# Patient Record
Sex: FEMALE | Race: WHITE | NOT HISPANIC OR LATINO | Employment: OTHER | URBAN - METROPOLITAN AREA
[De-identification: names, ages, dates, MRNs, and addresses within clinical notes are randomized per-mention and may not be internally consistent; named-entity substitution may affect disease eponyms.]

---

## 2017-03-08 ENCOUNTER — HISTORICAL (OUTPATIENT)
Dept: ADMINISTRATIVE | Facility: HOSPITAL | Age: 67
End: 2017-03-08

## 2017-05-19 ENCOUNTER — HISTORICAL (OUTPATIENT)
Dept: RADIOLOGY | Facility: HOSPITAL | Age: 67
End: 2017-05-19

## 2017-06-16 ENCOUNTER — HISTORICAL (OUTPATIENT)
Dept: RADIOLOGY | Facility: HOSPITAL | Age: 67
End: 2017-06-16

## 2017-08-16 ENCOUNTER — HISTORICAL (OUTPATIENT)
Dept: ADMINISTRATIVE | Facility: HOSPITAL | Age: 67
End: 2017-08-16

## 2017-11-15 ENCOUNTER — HISTORICAL (OUTPATIENT)
Dept: ADMINISTRATIVE | Facility: HOSPITAL | Age: 67
End: 2017-11-15

## 2018-08-06 ENCOUNTER — HISTORICAL (OUTPATIENT)
Dept: ADMINISTRATIVE | Facility: HOSPITAL | Age: 68
End: 2018-08-06

## 2018-12-06 ENCOUNTER — HISTORICAL (OUTPATIENT)
Dept: ADMINISTRATIVE | Facility: HOSPITAL | Age: 68
End: 2018-12-06

## 2019-03-20 ENCOUNTER — HISTORICAL (OUTPATIENT)
Dept: ADMINISTRATIVE | Facility: HOSPITAL | Age: 69
End: 2019-03-20

## 2019-05-06 ENCOUNTER — HISTORICAL (OUTPATIENT)
Dept: ADMINISTRATIVE | Facility: HOSPITAL | Age: 69
End: 2019-05-06

## 2019-08-30 ENCOUNTER — HISTORICAL (OUTPATIENT)
Dept: ADMINISTRATIVE | Facility: HOSPITAL | Age: 69
End: 2019-08-30

## 2019-10-07 ENCOUNTER — HISTORICAL (OUTPATIENT)
Dept: ADMINISTRATIVE | Facility: HOSPITAL | Age: 69
End: 2019-10-07

## 2020-06-10 ENCOUNTER — HISTORICAL (OUTPATIENT)
Dept: ADMINISTRATIVE | Facility: HOSPITAL | Age: 70
End: 2020-06-10

## 2020-10-26 ENCOUNTER — HISTORICAL (OUTPATIENT)
Dept: RADIOLOGY | Facility: HOSPITAL | Age: 70
End: 2020-10-26

## 2020-12-08 ENCOUNTER — HISTORICAL (OUTPATIENT)
Dept: SURGERY | Facility: HOSPITAL | Age: 70
End: 2020-12-08

## 2021-03-08 ENCOUNTER — HISTORICAL (OUTPATIENT)
Dept: ADMINISTRATIVE | Facility: HOSPITAL | Age: 71
End: 2021-03-08

## 2022-04-07 ENCOUNTER — HISTORICAL (OUTPATIENT)
Dept: ADMINISTRATIVE | Facility: HOSPITAL | Age: 72
End: 2022-04-07

## 2022-04-23 VITALS
WEIGHT: 205.25 LBS | HEIGHT: 66 IN | BODY MASS INDEX: 32.99 KG/M2 | DIASTOLIC BLOOD PRESSURE: 74 MMHG | SYSTOLIC BLOOD PRESSURE: 132 MMHG

## 2022-05-01 NOTE — HISTORICAL [facility] CERNER
This is a historical note converted from Jennifer. Formatting and pictures may have been removed.  Please reference Jennifer for original formatting and attached multimedia. Chief Complaint  Pt is here for bilateral lateral hip pain. Pt had a left hip bursectomy in December of 2020. Pt stated the pain never went away since the surgery.  History of Present Illness  Patient is here for?mild bilateral hip pain?and aching.? She says this pain feels different than?the bursal pain. ?She said most of the bursal pain has resolved. ?She wanted to make sure she did not need hip replacements.  Review of Systems  Systemic: No fever, no chills, and no recent weight change.  Head: No headache - frequent.  Eyes: No vision problems.  Otolarnygeal: No hearing loss, no earache, no epistaxis, no hoarseness, and no tooth pain. Gums normal.  Cardiovascular: No chest pain or discomfort and no palpitations.  Pulmonary: No pulmonary symptoms - difficulty sleeping, no dyspnea, and cough not worse in the morning.  Gastrointestinal: Appetite not decreased. No dysphagia and no constant eructation. No nausea, no vomiting, no abdominal pain, no hematochezia, and no loose/mushy stools - frequent. No constipation - frequent.  Genitourinary: No genitourinary symptoms - Getting up every night to urinate and no increase in urinary frequency. No urinary hesitancy. No urinary loss of control - difficulty stopping urination and no burning sensation during urination.  Musculoskeletal: No calf muscle cramps and no localized soft tissue swelling of the ankle.  Neurological: No fainting and no convulsions.  Psychological: Not feeling nervous tension, not feeling nervous from exhaustion, and no depression.  Skin: No rash. Previous history of no ulcers.  ?  Physical Exam  Vitals & Measurements  HR:?80(Peripheral)? BP:?132/74?  HT:?167.64?cm? WT:?93.100?kg? BMI:?33.13?  Bilateral hip exam:  Normal active and passive range of motion in both hips  No tenderness in  the groin?with weightbearing or with range of motion  No palpable tenderness?in the buttocks around the sciatic notch or?iliolumbar regions  No anterior hip tenderness  Very mild?tenderness?over the right and left greater trochanteric bursas  No palpable masses  No swelling, no redness, no increased heat  Healed left?lateral hip incision  2+ dorsal pedal pulses  Normal gait  Normal sensation  Normal motor function  No atrophy  AP pelvis radiograph and AP and lateral right hip radiograph shows?normal?cartilage spaces with no degenerative changes.? No evidence of fracture.? Normal-appearing osseous structures.  ?  Assessment/Plan  1.?Right hip pain?M25.551  ?Refer to pain management  No surgical indications  Ordered:  Office/Outpatient Visit Level 3 Established [id number] PC, Left knee pain  Right hip pain, [facility], 03/08/21 14:08:00 CST  ?  2.?Left hip pain?M25.552  ?  Referrals  Ambulatory Referral, Specialty: Pain Management, Reason: EVALUATE AND TREAT, Refer To: Provider Not Specified, [facility] [place] [address], [place] [address]., Start: 03/08/21 13:58:00 CST, Lumbar radiculopathy   Problem List/Past Medical History  Ongoing  Acute low back pain  Cervical spondylosis  Degenerative disc disease, cervical  Facet arthropathy  Left hip pain  Lumbar degenerative disc disease  Lumbar spine strain  Obesity  Trigger thumb of right hand  Trochanteric bursitis, left hip  Historical  No qualifying data  Procedure/Surgical History  Bursectomy Hip (Left) (12/08/2020)  Excision of Left Hip Bursa and Ligament, Open Approach (12/08/2020)  Excision; trochanteric bursa or calcification (12/08/2020)  Colon  Hernia  Hysterectomy   Medications  CeleBREX 100 mg oral capsule, 100 mg= 1 cap(s), Oral, Daily  cephalexin 250 mg oral capsule, 250 mg= 1 cap(s), Oral, TID,? ?Not Taking, Completed Rx  clindamycin 300 mg oral capsule, 300 mg= 1 cap(s), Oral, q6hr,? ?Not Taking,  Completed Rx  cyclobenzaprine 5 mg oral tablet, 5 mg= 1 tab(s), Oral, BID  Cymbalta 60 mg oral delayed release capsule, 60 mg= 1 cap(s), Oral, Daily,? ?Not Taking per Prescriber: Last Dose Date/Time Unknown  fluticasone 50 mcg/inh nasal spray  gabapentin 100 mg oral capsule, 100 mg= 1 cap(s), Oral, TID,? ?Not taking  gabapentin 300 mg oral capsule, 300 mg= 1 cap(s), Oral, TID,? ?Not Taking per Prescriber  glipiZIDE 10 mg oral tablet, 10 mg= 1 tab(s), Oral, BID  glipiZIDE 5 mg oral tablet, 5 mg= 1 tab(s), Oral, BID  homatropine-hydrocodone 1.5 mg-5 mg/5 mL oral syrup,? ?Not taking: Last Dose Date/Time Unknown  hydrochlorothiazide 12.5 mg oral tablet, 12.5 mg= 1 tab(s), Oral, qAM,? ?Not taking: Last Dose Date/Time Unknown  hydroxychloroquine 200 mg oral tablet, 200 mg= 1 tab(s), Oral, Daily,? ?Not taking: Last Dose Date/Time Unknown  ipratropium 42 mcg/inh (0.06%) nasal spray,? ?Not taking: Last Dose Date/Time Unknown  Medrol Dosepak 4 mg oral tablet, 1 packet(s), Oral, As Directed,? ?Not Taking, Completed Rx: Last Dose Date/Time Unknown  metformin 500 mg oral tablet, 500 mg= 1 tab(s), Oral, BID  methocarbamol 500 mg oral tablet, 500 mg= 1 tab(s), Oral, BID,? ?Not Taking per Prescriber  Mobic 7.5 mg oral tablet, 7.5 mg= 1 tab(s), Oral, BID, 3 refills,? ?Not taking: Last Dose Date/Time Unknown  Mobic 7.5 mg oral tablet, 7.5 mg= 1 tab(s), Oral, BID, 3 refills,? ?Not Taking, Completed Rx: Last Dose Date/Time Unknown  mupirocin 2% topical ointment, 1 keenan, TOP, TID,? ?Not Taking, Completed Rx: Last Dose Date/Time Unknown  naproxen 500 mg oral tablet, 500 mg= 1 tab(s), Oral, BID, 3 refills,? ?Not Taking, Completed Rx: Last Dose Date/Time Unknown  ondansetron 4 mg oral tablet, Oral, q8hr  ondansetron 4 mg oral tablet, disintegrating, 4 mg= 1 tab(s), SL, q6hr  Pantoprazole 40 mg ORAL EC-Tablet, 40 mg= 1 tab(s), Oral, Daily  pramipexole 1 mg oral tablet, 1 mg= 1 tab(s), Oral, Daily  rosuvastatin 20 mg oral tablet, 20 mg= 1  tab(s), Oral, Daily  traMADol 50 mg oral tablet, 50 mg= 1 tab(s), Oral, BID  traZODone 100 mg oral tablet, 200 mg= 2 tab(s), Oral, qPM  traZODONE 150 mg oral tab ( Desyrel ), 150 mg= 1 tab(s), Oral, Once a day (at bedtime),? ?Not taking: Last Dose Date/Time Unknown  triamcinolone 0.1% topical cream, 1 keenan, TOP, BID,? ?Not Taking, Completed Rx: Last Dose Date/Time Unknown  Ventolin HFA 90 mcg/inh inhalation aerosol  Vitamin D, Oral  Allergies  sulfa drugs?(Unknown)  Social History  Abuse/Neglect  No, No, Yes, 03/08/2021  No, 12/23/2020  Alcohol  Current, Wine, 3-5 times per week, 09/22/2016  Employment/School  Retired, 06/10/2020  Home/Environment  Lives with Alone., 06/10/2020  Substance Use  Never, 09/22/2016  Tobacco  Never (less than 100 in lifetime), N/A, 03/08/2021  Family History  Diabetes mellitus type 1.: Brother.  Health Maintenance  Health Maintenance  ???Pending?(in the next year)  ??? ??OverDue  ??? ? ? ?Diabetes Maintenance-HgbA1c due??09/29/18??and every 1??year(s)  ??? ? ? ?Influenza Vaccine due??10/01/20??and every 1??day(s)  ??? ? ? ?Advance Directive due??01/02/21??and every 1??year(s)  ??? ? ? ?Cognitive Screening due??01/02/21??and every 1??year(s)  ??? ??Due?  ??? ? ? ?ADL Screening due??03/08/21??and every 1??year(s)  ??? ? ? ?Aspirin Therapy for CVD Prevention due??03/08/21??and every 1??year(s)  ??? ? ? ?Bone Density Screening due??03/08/21??Variable frequency  ??? ? ? ?Breast Cancer Screening due??03/08/21??Unknown Frequency  ??? ? ? ?Colorectal Screening due??03/08/21??Unknown Frequency  ??? ? ? ?Diabetes Maintenance-Eye Exam due??03/08/21??Unknown Frequency  ??? ? ? ?Diabetes Maintenance-Foot Exam due??03/08/21??Unknown Frequency  ??? ? ? ?Diabetes Maintenance-Microalbumin due??03/08/21??Unknown Frequency  ??? ? ? ?Lung Cancer Screening due??03/08/21??and every 1??year(s)  ??? ? ? ?Medicare Annual Wellness Exam due??03/08/21??and every 1??year(s)  ??? ? ? ?Pneumococcal Vaccine  due??03/08/21??Unknown Frequency  ??? ? ? ?Tetanus Vaccine due??03/08/21??and every 10??year(s)  ??? ? ? ?Zoster Vaccine due??03/08/21??Unknown Frequency  ??? ??Due In Future?  ??? ? ? ?Obesity Screening not due until??01/01/22??and every 1??year(s)  ??? ? ? ?Smoking Cessation not due until??01/01/22??and every 1??year(s)  ??? ? ? ?Alcohol Misuse Screening not due until??01/02/22??and every 1??year(s)  ??? ? ? ?Fall Risk Assessment not due until??01/02/22??and every 1??year(s)  ??? ? ? ?Functional Assessment not due until??01/02/22??and every 1??year(s)  ???Satisfied?(in the past 1 year)  ??? ??Satisfied?  ??? ? ? ?Advance Directive on??12/08/20.??Satisfied by Carol Ann Rivera RN.  ??? ? ? ?Alcohol Misuse Screening on??03/08/21.??Satisfied by Ana Paula Burris LPN  ??? ? ? ?Blood Pressure Screening on??03/08/21.??Satisfied by Ana Paula Burris LPN  ??? ? ? ?Body Mass Index Check on??03/08/21.??Satisfied by Ana Paula Burris LPN  ??? ? ? ?Coronary Artery Disease Maintenance-Lipid Lowering Therapy on??11/19/20.  ??? ? ? ?Depression Screening on??03/08/21.??Satisfied by Ana Paula Burris LPN  ??? ? ? ?Diabetes Screening on??11/20/20.??Satisfied by Jojo Mcgrath  ??? ? ? ?Fall Risk Assessment on??03/08/21.??Satisfied by Ana Paula Burris LPN  ??? ? ? ?Functional Assessment on??03/08/21.??Satisfied by Ana Paula Burris LPN  ??? ? ? ?Obesity Screening on??03/08/21.??Satisfied by Ana Paula Burris LPN  ??? ? ? ?Smoking Cessation on??03/08/21.??Satisfied by Ana Paula Burris LPN??Reason: Expectation Satisfied Elsewhere  ?

## 2022-05-01 NOTE — HISTORICAL [facility]ERN[facility]
This is a historical note converted from Jennifer. Formatting and pictures may have been removed.  Please reference Jennifer for original formatting and attached multimedia. Chief Complaint  NECK PAIN  History of Present Illness  She is complaining of neck pain and lower back pain. ?This is been for some time. ?The neck pain is progressively getting worse. ?She has no radiation into her arms or hands. ?No numbness or tingling. ?No weakness in her upper extremities.? She just has inability to turn or move her neck.  ?  Of her lower back this is something that comes?and goes. ?I have seen her in the past for it with degenerative disc disease. ?It is now progressively getting worse.? She is unable to sweep?are to do certain things.? Certain activities make it worse. ?She has no radiation into her legs or feet no numbness or tingling no bowel or bladder problems.? She does take Celebrex 200 mg once a day.? She does have diabetes.  Review of Systems  Comprehensive Review of Systems performed with no exceptions other than as noted in HPI.  ?  ?  Physical Exam  Vitals & Measurements  T:?98.4? ?F (Oral)? BP:?137/76?  HT:?168?cm? WT:?89.35?kg? BMI:?31.66?  Examination of the cervical spine. ?She sits with a head forward posture. ?She has very limited flexion extension lateral bending and rotation.? Biceps, triceps, brachioradialis reflexes are +2 and symmetrical. ?Shoulder girdle, biceps, triceps, wrist extension flexor strength are normal?and symmetrical. ?Radial pulses +2 and symmetrical. ?Tinel signs are negative at the wrist and elbow. ?Sensation is equal to light touch in both upper extremities.  ?  Examination of the lumbar spine there is tenderness from both posterior superior iliac spines to the SI joints. ?There is no sciatic notch tenderness. ?No greater trochanter tenderness. ?She flexes fingertips to just about her knees. ?Extension and lateral bending are limited due to pain.? She can walk on heels and toes. ?Knee  jerks and ankle jerks are trace but symmetrical. ?Dorsalis pedis posterior tibial pulses +2 and symmetrical. ?Babinski is normal there is no clonus. ?Sitting straight leg raising is negative. ?Supine straight leg raising is positive for back pain bilaterally about 70 degrees with negative Go test.? Sensation is equal to light touch?in both lower extremities.  ?  Examination of her abdomen?it is nontender there is no organomegaly no pulsatile masses.  Assessment/Plan  1.?Acute low back pain?M54.5  ?We talked about a Medrol dose but with her diabetes I would rather not. ?We are increasing her Celebrex 200 mg twice a day I have given her Robaxin to take 750 mg 3-4 times a day as needed?we will start her on physical therapy. ?If she develops radicular symptoms?or does not improve or gets worse she will call me back.  ?  Components of this note were documented using voice recognition systems and are subject to errors not corrected at proof reading. ?Please contact the author for any clarifications.  Ordered:  methocarbamol, 750 mg = 1 tab(s), Oral, TID, PRN PRN spasm, X 14 day(s), # 21 tab(s), 0 Refill(s), Pharmacy: [facility] #[id number], 168, cm, Height/Length Dosing, 06/10/20 9:35:00 CDT, 89.35, kg, Weight Dosing, 06/10/20 9:35:00 CDT  Office/Outpatient Visit Level 4 Established [id number] PC, Acute low back pain  Degenerative disc disease, cervical  Cervical spondylosis  Lumbar degenerative disc disease  Facet arthropathy  Acute neck pain, [facility], 06/10/20 11:03:00 CDT  PT/OT External Referral, 06/10/20 11:03:00 CDT, Acute low back pain  Degenerative disc disease, cervical  Cervical spondylosis  Lumbar degenerative disc disease  Facet arthropathy  Acute neck pain, Evaluate and Treat, 3 X Week, ***General PT Orders**  ?  2.?Degenerative disc disease, cervical?M50.30  Ordered:  methocarbamol, 750 mg = 1 tab(s), Oral, TID, PRN PRN spasm, X 14 day(s), # 21 tab(s), 0 Refill(s), Pharmacy: [facility] #[id number],  168, cm, Height/Length Dosing, 06/10/20 9:35:00 CDT, 89.35, kg, Weight Dosing, 06/10/20 9:35:00 CDT  Office/Outpatient Visit Level 4 Established [id number] PC, Acute low back pain  Degenerative disc disease, cervical  Cervical spondylosis  Lumbar degenerative disc disease  Facet arthropathy  Acute neck pain, [facility], 06/10/20 11:03:00 CDT  PT/OT External Referral, 06/10/20 11:03:00 CDT, Acute low back pain  Degenerative disc disease, cervical  Cervical spondylosis  Lumbar degenerative disc disease  Facet arthropathy  Acute neck pain, Evaluate and Treat, 3 X Week, ***General PT Orders**  ?  3.?Cervical spondylosis?M47.812  Ordered:  methocarbamol, 750 mg = 1 tab(s), Oral, TID, PRN PRN spasm, X 14 day(s), # 21 tab(s), 0 Refill(s), Pharmacy: [facility]/pharmacy #[id number], 168, cm, Height/Length Dosing, 06/10/20 9:35:00 CDT, 89.35, kg, Weight Dosing, 06/10/20 9:35:00 CDT  Office/Outpatient Visit Level 4 Established [id number] PC, Acute low back pain  Degenerative disc disease, cervical  Cervical spondylosis  Lumbar degenerative disc disease  Facet arthropathy  Acute neck pain, [facility], 06/10/20 11:03:00 CDT  PT/OT External Referral, 06/10/20 11:03:00 CDT, Acute low back pain  Degenerative disc disease, cervical  Cervical spondylosis  Lumbar degenerative disc disease  Facet arthropathy  Acute neck pain, Evaluate and Treat, 3 X Week, ***General PT Orders**  ?  4.?Lumbar degenerative disc disease?M51.36  Ordered:  methocarbamol, 750 mg = 1 tab(s), Oral, TID, PRN PRN spasm, X 14 day(s), # 21 tab(s), 0 Refill(s), Pharmacy: [facility]/pharmacy #[id number], 168, cm, Height/Length Dosing, 06/10/20 9:35:00 CDT, 89.35, kg, Weight Dosing, 06/10/20 9:35:00 CDT  Office/Outpatient Visit Level 4 Established [id number] PC, Acute low back pain  Degenerative disc disease, cervical  Cervical spondylosis  Lumbar degenerative disc disease  Facet arthropathy  Acute neck pain, [facility], 06/10/20 11:03:00 CDT  PT/OT External Referral,  06/10/20 11:03:00 CDT, Acute low back pain  Degenerative disc disease, cervical  Cervical spondylosis  Lumbar degenerative disc disease  Facet arthropathy  Acute neck pain, Evaluate and Treat, 3 X Week, ***General PT Orders**  ?  5.?Facet arthropathy?M47.819  Ordered:  methocarbamol, 750 mg = 1 tab(s), Oral, TID, PRN PRN spasm, X 14 day(s), # 21 tab(s), 0 Refill(s), Pharmacy: [facility]/pharmacy #[id number], 168, cm, Height/Length Dosing, 06/10/20 9:35:00 CDT, 89.35, kg, Weight Dosing, 06/10/20 9:35:00 CDT  Office/Outpatient Visit Level 4 Established [id number] PC, Acute low back pain  Degenerative disc disease, cervical  Cervical spondylosis  Lumbar degenerative disc disease  Facet arthropathy  Acute neck pain, [facility], 06/10/20 11:03:00 CDT  PT/OT External Referral, 06/10/20 11:03:00 CDT, Acute low back pain  Degenerative disc disease, cervical  Cervical spondylosis  Lumbar degenerative disc disease  Facet arthropathy  Acute neck pain, Evaluate and Treat, 3 X Week, ***General PT Orders**  ?  Orders:  Clinic Follow-up PRN, 06/10/20 11:03:00 CDT, Future Order, [facility]  Referrals  Clinic Follow-up PRN, 06/10/20 11:03:00 CDT, Future Order, [facility]  PT/OT External Referral, 06/10/20 11:03:00 CDT, Acute low back pain  Degenerative disc disease, cervical  Cervical spondylosis  Lumbar degenerative disc disease  Facet arthropathy  Acute neck pain, Evaluate and Treat, 3 X Week, ***General PT Orders**   Problem List/Past Medical History  Ongoing  Acute low back pain  Cervical spondylosis  Degenerative disc disease, cervical  Facet arthropathy  Lumbar degenerative disc disease  Lumbar spine strain  Obesity  Trigger thumb of right hand  Trochanteric bursitis, left hip  Historical  No qualifying data  Procedure/Surgical History  Colon  Hernia  Hysterectomy   Medications  acetaminophen-hydrocodone 325 mg-7.5 mg oral tablet, 1 tab(s), Oral, q4-6hr  Aczone 7.5% topical gel, 1 keenan, TOP, qAM  Amoxil 500 mg  Cap, 500 mg= 1 cap(s), Oral, QID  aspirin 81 mg oral tablet, 81 mg= 1 tab(s), Oral, BID  azithromycin 250 mg oral tablet, Oral  benzonatate 100 mg oral capsule, Oral, TID  cefdinir 300 mg oral capsule, 300 mg= 1 cap(s), Oral, BID  CeleBREX 200 mg oral capsule, 200 mg= 1 cap(s), Oral, BID,? ?Not taking: Last Dose Date/Time Unknown  cephalexin 250 mg oral capsule, 250 mg= 1 cap(s), Oral, TID  clindamycin 300 mg oral capsule, 300 mg= 1 cap(s), Oral, q6hr  Crestor, Oral, Once a day (at bedtime)  cyclobenzaprine 5 mg oral tablet, 5 mg= 1 tab(s), Oral, BID  Cymbalta 60 mg oral delayed release capsule, 60 mg= 1 cap(s), Oral, Daily,? ?Not Taking per Prescriber: Last Dose Date/Time Unknown  Dexilant, Oral, Daily,? ?Not taking: Last Dose Date/Time Unknown  dextromethorphan-promethazine 15 mg-6.25 mg/5 mL oral syrup, Oral, q6hr  doxepin 10 mg oral capsule, 10 mg= 1 cap(s), Oral, TID  doxepin 25 mg oral capsule, 25 mg= 1 cap(s), Oral, TID  doxepin 50 mg oral capsule, 50 mg= 1 cap(s), Oral, TID  doxycycline monohydrate 100 mg oral capsule, 100 mg= 1 cap(s), Oral, BID  Fish Oil oral capsule, 1 cap(s), Oral, Daily  fluticasone 50 mcg/inh nasal spray  gabapentin 100 mg oral capsule, 100 mg= 1 cap(s), Oral, TID  gabapentin 300 mg oral capsule, 300 mg= 1 cap(s), Oral, TID  gabapentin 600 mg oral tablet, 600 mg= 1 tab(s), Oral, TID,? ?Not taking: Last Dose Date/Time Unknown  glimepiride 2 mg oral tablet, 2 mg= 1 tab(s), Oral, BID  homatropine-hydrocodone 1.5 mg-5 mg/5 mL oral syrup  hydrochlorothiazide 12.5 mg oral tablet, 12.5 mg= 1 tab(s), Oral, qAM,? ?Not taking: Last Dose Date/Time Unknown  hydroxychloroquine 200 mg oral tablet, 200 mg= 1 tab(s), Oral, Daily,? ?Not taking: Last Dose Date/Time Unknown  ipratropium 42 mcg/inh (0.06%) nasal spray  Livalo 2 mg oral tablet, 2 mg= 1 tab(s), Oral, Daily,? ?Not taking: Last Dose Date/Time Unknown  Medrol Dosepak 4 mg oral tablet, 1 packet(s), Oral, As Directed,? ?Not Taking per  Prescriber: Last Dose Date/Time Unknown  metformin 500 mg oral tablet, 500 mg= 1 tab(s), Oral, BID  Mobic 7.5 mg oral tablet, 7.5 mg= 1 tab(s), Oral, BID, 3 refills,? ?Not taking: Last Dose Date/Time Unknown  Mobic 7.5 mg oral tablet, 7.5 mg= 1 tab(s), Oral, BID, 3 refills,? ?Not Taking, Completed Rx: Last Dose Date/Time Unknown  mupirocin 2% topical ointment, 1 keenan, TOP, TID  naproxen 500 mg oral tablet, 500 mg= 1 tab(s), Oral, BID, 3 refills  ondansetron 4 mg oral tablet, Oral, q8hr  Pantoprazole 40 mg ORAL EC-Tablet, 40 mg= 1 tab(s), Oral, Daily  pioglitazone 30 mg oral tablet, 30 mg= 1 tab(s), Oral, Daily,? ?Not taking: Last Dose Date/Time Unknown  pramipexole 0.25 mg oral tablet, 0.25 mg= 1 tab(s), Oral, TID  pramipexole 0.5 mg oral tablet, 0.5 mg= 1 tab(s), Oral, TID,? ?Not taking: Last Dose Date/Time Unknown  pramipexole 1 mg oral tablet, 1 mg= 1 tab(s), Oral, Daily,? ?Not taking: Last Dose Date/Time Unknown  pravastatin 10 mg oral tablet, 10 mg= 1 tab(s), Oral, Daily,? ?Not taking  prednisONE 20 mg oral tablet, 20 mg= 1 tab(s), Oral, Daily,? ?Not taking: Last Dose Date/Time Unknown  prednisONE 50 mg oral tablet, 50 mg= 1 tab(s), Oral, Daily,? ?Not taking: Last Dose Date/Time Unknown  Robaxin 750 mg oral tablet, 750 mg= 1 tab(s), Oral, TID, PRN  spironolactone 100 mg oral tablet, 100 mg= 1 tab(s), Oral, Daily  temazepam 7.5 mg oral capsule, 7.5 mg, Oral, qPM  traMADol 50 mg oral tablet, 50 mg= 1 tab(s), Oral, BID  traZODone 100 mg oral tablet, 200 mg= 2 tab(s), Oral, qPM  traZODONE 150 mg oral tab ( Desyrel ), 150 mg= 1 tab(s), Oral, Once a day (at bedtime),? ?Not taking: Last Dose Date/Time Unknown  triamcinolone 0.1% topical cream, 1 keenan, TOP, BID  Ventolin HFA 90 mcg/inh inhalation aerosol  Vitamin D, Oral  Allergies  sulfa drugs  Social History  Abuse/Neglect  No, No, Yes, 06/10/2020  Alcohol  Current, Wine, 3-5 times per week, 09/22/2016  Employment/School  Retired, 06/10/2020  Home/Environment  Lives  with Alone., 06/10/2020  Substance Use  Never, 09/22/2016  Tobacco  Never (less than 100 in lifetime), N/A, Household tobacco concerns: No., 06/10/2020  Family History  Diabetes mellitus type 1.: Brother.  Health Maintenance  Health Maintenance  ???Pending?(in the next year)  ??? ??OverDue  ??? ? ? ?Diabetes Maintenance-HgbA1c due??09/29/18??and every 1??year(s)  ??? ? ? ?Aspirin Therapy for CVD Prevention due??08/06/19??and every 1??year(s)  ??? ? ? ?Advance Directive due??01/01/20??and every 1??year(s)  ??? ??Due?  ??? ? ? ?ADL Screening due??06/10/20??and every 1??year(s)  ??? ? ? ?Bone Density Screening due??06/10/20??Variable frequency  ??? ? ? ?Breast Cancer Screening (Senior Wellness) due??06/10/20??and every?  ??? ? ? ?Colorectal Screening (Senior Wellness) due??06/10/20??and every?  ??? ? ? ?Diabetes Maintenance-Eye Exam due??06/10/20??and every?  ??? ? ? ?Diabetes Maintenance-Foot Exam due??06/10/20??and every?  ??? ? ? ?Medicare Annual Wellness Exam due??06/10/20??and every 1??year(s)  ??? ? ? ?Pneumococcal Vaccine due??06/10/20??Variable frequency  ??? ? ? ?Pneumococcal Vaccine due??06/10/20??and every?  ??? ? ? ?Tetanus Vaccine due??06/10/20??and every 10??year(s)  ??? ? ? ?Zoster Vaccine due??06/10/20??and every 100??year(s)  ??? ??Due In Future?  ??? ? ? ?Diabetes Maintenance-Fasting Lipid Profile not due until??08/29/20??and every 1??year(s)  ??? ? ? ?Alcohol Misuse Screening not due until??01/01/21??and every 1??year(s)  ??? ? ? ?Cognitive Screening not due until??01/01/21??and every 1??year(s)  ??? ? ? ?Fall Risk Assessment not due until??01/01/21??and every 1??year(s)  ??? ? ? ?Functional Assessment not due until??01/01/21??and every 1??year(s)  ??? ? ? ?Obesity Screening not due until??01/01/21??and every 1??year(s)  ??? ? ? ?Smoking Cessation (Diabetes) not due until??05/05/21??and every 2??year(s)  ???Satisfied?(in the past 1 year)  ??? ??Satisfied?  ??? ? ? ?Alcohol Misuse Screening  on??06/10/20.??Satisfied by Kathy Laguna  ??? ? ? ?Blood Pressure Screening on??06/10/20.??Satisfied by Kathy Laguna  ??? ? ? ?Body Mass Index Check on??06/10/20.??Satisfied by Kathy Laguna  ??? ? ? ?Coronary Artery Disease Maintenance-Lipid Lowering Therapy on??10/07/19.  ??? ? ? ?Diabetes Maintenance-Fasting Lipid Profile on??08/30/19.??Satisfied by Kamini Matamoros.  ??? ? ? ?Fall Risk Assessment on??10/07/19.??Satisfied by Kathy Laguna  ??? ? ? ?Lipid Screening on??08/30/19.??Satisfied by Kamini Matamoros.  ??? ? ? ?Obesity Screening on??06/10/20.??Satisfied by Kathy Laguna  ?  Diagnostic Results  3 views of the cervical spine there is significant cervical spondylosis?at C5-6 and C6-7. ?There is also some at C4-5.  ?  3 views of the lumbar spine there is mild to moderate?disc degenerative disease.? Throughout.

## 2022-05-01 NOTE — HISTORICAL [facility]
This is a historical note converted from Jennifer. Formatting and pictures may have been removed.  Please reference Jennifer for original formatting and attached multimedia. Chief Complaint  LEFT HIP PAIN WAS GIVEN AN INJECTION ON LAST VISIT ON 5/6/19. ?IT ONLT HELPED FOR A SHORT TIME AND NOW THE PAIN IS GOING DOWN HER LEG.  History of Present Illness  She returns today for the continued left hip and leg pain.?She does have a history of a T12 compression fracture and some mild degenerative changes lumbar spine. The pain today is about 2 weeks old and it started?more?anterior in the hip and radiates to the anterior thigh and anterior shin.?She does not have any numbness or tingling just pain down the leg. She notes no weakness.?It is worse whenever she sleeps on it?and this causes the hip pain also.  Review of Systems  Comprehensive Review of Systems performed with no exceptions other than as noted in HPI.  ?  ?  Physical Exam  Vitals & Measurements  T:?98.0? ?F (Oral)? BP:?124/79?  HT:?168?cm? WT:?90.71?kg? BMI:?32.14?  Examination of the lumbar spine. She has no tenderness to palpation lumbar spine. She flexes fingertips to her ankles.?Extension is full. Lateral bending to the right does cause some left-sided?hip and?lower back pain. She does have obvious left greater trochanter tenderness no sciatic notch tenderness. She can walk on her heels and toes. Knee jerks and ankle jerks +2 and symmetrical. Babinski is normal. There is no clonus.?Knee jerks are +2 and symmetrical left ankle jerk?is 1+ right ankle jerk is?1+.?Her leg raising is negative bilaterally. Sensations equal light touch in both lower extremities.?Dorsalis pedis posterior tibial pulses are +2 and symmetrical.  ?  Examination of the left hip again she is tender directly superior and slightly anterior to the greater trochanter.?She has pain on flexion and internal rotation.  Assessment/Plan  1.?Acute low back pain?M54.5  ?Well going to obtain an MRI of the  lumbar spine to rule out radiculopathy.?This may be just the chronic trochanteric tendinitis. I did recommend that she accepted a?injection into the left trochanter I will see her back next week.  ?  Under sterile conditions the left?trochanteric bursa was injected with 2 mL of Celestone and 2 mL of plain Xylocaine.  Ordered:  Clinic Follow up, *Est. 10/14/19 3:00:00 CDT, Order for future visit, Acute low back pain  Trochanteric bursitis, left hip  Hip pain, [facility]  MRI Lumbar Spine W/O Contrast, Routine, 10/07/19 14:45:00 CDT, Other (please specify), None, Ambulatory, Rad Type, Order for future visit, Acute low back pain  Trochanteric bursitis, left hip  Hip pain, Schedule this test, [facility], 10/07/19 14:45:00...  Office/Outpatient Visit Level 3 Established [id number] PC, Acute low back pain  Trochanteric bursitis, left hip  Hip pain, [facility], 10/07/19 14:45:00 CDT  ?  2.?Trochanteric bursitis, left hip?M70.62  Ordered:  Clinic Follow up, *Est. 10/14/19 3:00:00 CDT, Order for future visit, Acute low back pain  Trochanteric bursitis, left hip  Hip pain, [facility]  MRI Lumbar Spine W/O Contrast, Routine, 10/07/19 14:45:00 CDT, Other (please specify), None, Ambulatory, Rad Type, Order for future visit, Acute low back pain  Trochanteric bursitis, left hip  Hip pain, Schedule this test, [facility], 10/07/19 14:45:00...  Office/Outpatient Visit Level 3 Established [id number] [facility], Acute low back pain  Trochanteric bursitis, left hip  Hip pain, [facility], 10/07/19 14:45:00 CDT  ?  Orders:  XR Hip Left 2 Views, Routine, 10/07/19 14:16:00 CDT, Arthritis, None, Ambulatory, Rad Type, Hip pain, Not Scheduled, 10/07/19 14:16:00 CDT  Referrals  Clinic Follow up, *Est. 10/14/19 3:00:00 CDT, Order for future visit, Acute low back pain  Trochanteric bursitis, left hip  Hip pain, [facility]   Problem List/Past Medical History  Ongoing  Acute  low back pain  Lumbar spine strain  Obesity  Trigger thumb of right hand  Trochanteric bursitis, left hip  Historical  No qualifying data  Procedure/Surgical History  Colon  Hernia  Hysterectomy   Medications  acetaminophen-hydrocodone 325 mg-7.5 mg oral tablet, 1 tab(s), Oral, q4-6hr  Aczone 7.5% topical gel, 1 keenan, TOP, qAM  Amoxil 500 mg Cap, 500 mg= 1 cap(s), Oral, QID  aspirin 81 mg oral tablet, 81 mg= 1 tab(s), Oral, BID  azithromycin 250 mg oral tablet, Oral  benzonatate 100 mg oral capsule, Oral, TID  cefdinir 300 mg oral capsule, 300 mg= 1 cap(s), Oral, BID  CeleBREX 200 mg oral capsule, 200 mg= 1 cap(s), Oral, BID,? ?Not taking: Last Dose Date/Time Unknown  cephalexin 250 mg oral capsule, 250 mg= 1 cap(s), Oral, TID  clindamycin 300 mg oral capsule, 300 mg= 1 cap(s), Oral, q6hr  Crestor, Oral, Once a day (at bedtime),? ?Not taking: Last Dose Date/Time Unknown  cyclobenzaprine 5 mg oral tablet, 5 mg= 1 tab(s), Oral, BID  Cymbalta 60 mg oral delayed release capsule, 60 mg= 1 cap(s), Oral, Daily,? ?Not Taking per Prescriber: Last Dose Date/Time Unknown  Dexilant, Oral, Daily,? ?Not taking: Last Dose Date/Time Unknown  dextromethorphan-promethazine 15 mg-6.25 mg/5 mL oral syrup, Oral, q6hr  doxepin 10 mg oral capsule, 10 mg= 1 cap(s), Oral, TID  doxepin 25 mg oral capsule, 25 mg= 1 cap(s), Oral, TID  doxepin 50 mg oral capsule, 50 mg= 1 cap(s), Oral, TID  doxycycline monohydrate 100 mg oral capsule, 100 mg= 1 cap(s), Oral, BID  Fish Oil oral capsule, 1 cap(s), Oral, Daily  fluticasone 50 mcg/inh nasal spray  gabapentin 100 mg oral capsule, 100 mg= 1 cap(s), Oral, TID  gabapentin 300 mg oral capsule, 300 mg= 1 cap(s), Oral, TID  gabapentin 600 mg oral tablet, 600 mg= 1 tab(s), Oral, TID  glimepiride 2 mg oral tablet, 2 mg= 1 tab(s), Oral, BID  homatropine-hydrocodone 1.5 mg-5 mg/5 mL oral syrup  hydrochlorothiazide 12.5 mg oral tablet, 12.5 mg= 1 tab(s), Oral, qAM  hydroxychloroquine 200 mg oral tablet, 200 mg=  1 tab(s), Oral, Daily  ipratropium 42 mcg/inh (0.06%) nasal spray  Livalo 2 mg oral tablet, 2 mg= 1 tab(s), Oral, Daily  Medrol Dosepak 4 mg oral tablet, 1 packet(s), Oral, As Directed,? ?Not Taking per Prescriber: Last Dose Date/Time Unknown  metformin 500 mg oral tablet, 500 mg= 1 tab(s), Oral, BID  Mobic 7.5 mg oral tablet, 7.5 mg= 1 tab(s), Oral, BID, 3 refills,? ?Not taking: Last Dose Date/Time Unknown  Mobic 7.5 mg oral tablet, 7.5 mg= 1 tab(s), Oral, BID, 3 refills,? ?Not Taking, Completed Rx: Last Dose Date/Time Unknown  mupirocin 2% topical ointment, 1 keenan, TOP, TID  naproxen 500 mg oral tablet, 500 mg= 1 tab(s), Oral, BID, 3 refills  ondansetron 4 mg oral tablet, Oral, q8hr  Pantoprazole 40 mg ORAL EC-Tablet, 40 mg= 1 tab(s), Oral, Daily  pioglitazone 30 mg oral tablet, 30 mg= 1 tab(s), Oral, Daily  pramipexole 0.25 mg oral tablet, 0.25 mg= 1 tab(s), Oral, TID  pramipexole 0.5 mg oral tablet, 0.5 mg= 1 tab(s), Oral, TID  pramipexole 1 mg oral tablet, 1 mg= 1 tab(s), Oral, Daily  pravastatin 10 mg oral tablet, 10 mg= 1 tab(s), Oral, Daily  prednisONE 20 mg oral tablet, 20 mg= 1 tab(s), Oral, Daily  prednisONE 50 mg oral tablet, 50 mg= 1 tab(s), Oral, Daily  spironolactone 100 mg oral tablet, 100 mg= 1 tab(s), Oral, Daily  temazepam 7.5 mg oral capsule, 7.5 mg, Oral, qPM  traMADol 50 mg oral tablet, 50 mg= 1 tab(s), Oral, BID  traZODone 100 mg oral tablet, 200 mg= 2 tab(s), Oral, qPM  traZODONE 150 mg oral tab ( Desyrel ), 150 mg= 1 tab(s), Oral, Once a day (at bedtime),? ?Not taking: Last Dose Date/Time Unknown  triamcinolone 0.1% topical cream, 1 keenan, TOP, BID  Ventolin HFA 90 mcg/inh inhalation aerosol  Vitamin D, Oral  Allergies  sulfa drugs  Social History  Abuse/Neglect  No, No, Yes, 10/07/2019  Alcohol  Current, Wine, 3-5 times per week, 12/07/2016  Current, Wine, 3-5 times per week, 09/22/2016  Substance Use  Never, 09/22/2016  Tobacco  Never (less than 100 in lifetime), N/A, 10/07/2019  Family  History  Diabetes mellitus type 1.: Brother.  Health Maintenance  Health Maintenance  ???Pending?(in the next year)  ??? ??OverDue  ??? ? ? ?Diabetes Maintenance-HgbA1c due??09/29/18??and every 1??year(s)  ??? ? ? ?Advance Directive due??01/01/19??and every 1??year(s)  ??? ? ? ?Alcohol Misuse Screening due??01/01/19??and every 1??year(s)  ??? ? ? ?Cognitive Screening due??01/01/19??and every 1??year(s)  ??? ? ? ?Functional Assessment due??01/01/19??and every 1??year(s)  ??? ? ? ?Geriatric Depression Screening due??01/01/19??and every 1??year(s)  ??? ? ? ?Aspirin Therapy for CVD Prevention due??08/06/19??and every 1??year(s)  ??? ??Due?  ??? ? ? ?ADL Screening due??10/07/19??and every 1??year(s)  ??? ? ? ?Bone Density Screening due??10/07/19??Variable frequency  ??? ? ? ?Breast Cancer Screening (Senior Wellness) due??10/07/19??and every?  ??? ? ? ?Colorectal Screening (Senior Wellness) due??10/07/19??and every?  ??? ? ? ?Diabetes Maintenance-Eye Exam due??10/07/19??and every?  ??? ? ? ?Diabetes Maintenance-Foot Exam due??10/07/19??and every?  ??? ? ? ?Pneumococcal Vaccine due??10/07/19??Variable frequency  ??? ? ? ?Pneumococcal Vaccine due??10/07/19??and every?  ??? ? ? ?Tetanus Vaccine due??10/07/19??and every 10??year(s)  ??? ? ? ?Zoster Vaccine due??10/07/19??and every 100??year(s)  ??? ??Due In Future?  ??? ? ? ?Fall Risk Assessment not due until??01/01/20??and every 1??year(s)  ??? ? ? ?Obesity Screening not due until??01/01/20??and every 1??year(s)  ??? ? ? ?Diabetes Maintenance-Fasting Lipid Profile not due until??08/29/20??and every 1??year(s)  ???Satisfied?(in the past 1 year)  ??? ??Satisfied?  ??? ? ? ?Blood Pressure Screening on??10/07/19.??Satisfied by Kathy Laguna  ??? ? ? ?Body Mass Index Check on??10/07/19.??Satisfied by Kathy Laguna  ??? ? ? ?Coronary Artery Disease Maintenance-Lipid Lowering Therapy on??10/07/19.  ??? ? ? ?Diabetes Maintenance-Fasting Lipid Profile on??08/30/19.??Satisfied by  Kamini Matamoros  ??? ? ? ?Fall Risk Assessment on??10/07/19.??Satisfied by Kathy Laguna  ??? ? ? ?Lipid Screening on??08/30/19.??Satisfied by Kamini Matamoros.  ??? ? ? ?Obesity Screening on??10/07/19.??Satisfied by Kathy Laguna  ?

## 2022-05-01 NOTE — HISTORICAL [facility]
This is a historical note converted from Jennifer. Formatting and pictures may have been removed.  Please reference Jennifer for original formatting and attached multimedia. Chief Complaint  LEFT HIP SHE HAS SEEN DR. MARISCAL IN THE PAST HER LAST INJECTION WAS IN SEPTEMBER,2018.  History of Present Illness  I have not seen her since September. She is in again with her left trochanteric bursitis. She walks 6 days a week for an hour. This time it started hurting while she was walking in the last few weeks.  Review of Systems  Comprehensive Review of Systems performed with no exceptions other than as noted in HPI.  ?  ?  Physical Exam  Vitals & Measurements  BP:?114/77?  HT:?168?cm? WT:?89.53?kg? BMI:?31.72?  Examination of the left hip. She has a full range of motion. She is point tender directly over the greater trochanter. Neurovascularly she is intact.  Assessment/Plan  1.?Trochanteric bursitis, left hip?M70.62  ? We discussed options and she would like to have another injection. I agreed. We also discussed shoe wear?with is much walking as she does she has not changed her alternated any shoes I recommended that she do that.?She will obtain a new?pair of shoes.?I will see her?back?in 3 weeks.  ?  Under sterile conditions 2 mL of Celestone and 2 mL of plain Xylocaine was injected into the left trochanteric bursa.  ?  Hip pain?M25.559  Ordered:  XR Hip Left 2 Views, Routine, 03/20/19 11:05:00 CDT, Pain, None, Ambulatory, Rad Type, Hip pain, Not Scheduled, 03/20/19 11:05:00 CDT  ?   Problem List/Past Medical History  Ongoing  Acute low back pain  Lumbar spine strain  Obesity  Trigger thumb of right hand  Trochanteric bursitis, left hip  Historical  No qualifying data  Procedure/Surgical History  Colon  Hernia  Hysterectomy   Medications  aspirin 81 mg oral tablet, 81 mg= 1 tab(s), Oral, BID  CeleBREX 200 mg oral capsule, 200 mg= 1 cap(s), Oral, BID,? ?Not taking: Last Dose Date/Time Unknown  Crestor, Oral, Once a day (at  bedtime),? ?Not taking: Last Dose Date/Time Unknown  Cymbalta 60 mg oral delayed release capsule, 60 mg= 1 cap(s), Oral, Daily,? ?Not Taking per Prescriber: Last Dose Date/Time Unknown  Dexilant, Oral, Daily,? ?Not taking: Last Dose Date/Time Unknown  Fish Oil oral capsule, 1 cap(s), Oral, Daily  metformin 500 mg oral tablet, 500 mg= 1 tab(s), Oral, BID  Mobic 7.5 mg oral tablet, 7.5 mg= 1 tab(s), Oral, BID, 3 refills,? ?Not taking: Last Dose Date/Time Unknown  Mobic 7.5 mg oral tablet, 7.5 mg= 1 tab(s), Oral, BID, 3 refills,? ?Not Taking, Completed Rx: Last Dose Date/Time Unknown  pramipexole 0.5 mg oral tablet, 0.5 mg= 1 tab(s), Oral, TID  traZODONE 150 mg oral tab ( Desyrel ), 150 mg= 1 tab(s), Oral, Once a day (at bedtime),? ?Not taking: Last Dose Date/Time Unknown  Vitamin D, Oral  Allergies  sulfa drugs  Social History  Alcohol  Current, Wine, 3-5 times per week, 12/07/2016  Current, Wine, 3-5 times per week, 09/22/2016  Substance Abuse  Never, 12/07/2016  Never, 09/22/2016  Tobacco  Never (less than 100 in lifetime), No, 03/20/2019  Never smoker, 12/07/2016  Never smoker, 09/22/2016  Family History  Diabetes mellitus type 1.: Brother.  Health Maintenance  Health Maintenance  ???Pending?(in the next year)  ??? ??OverDue  ??? ? ? ?Coronary Artery Disease Maintenance-Antiplatelet Agent Prescribed due??and every?  ??? ? ? ?Coronary Artery Disease Maintenance-Lipid Lowering Therapy due??and every?  ??? ??Due?  ??? ? ? ?ADL Screening due??03/20/19??and every 1??year(s)  ??? ? ? ?Advance Directive due??03/20/19??and every 1??year(s)  ??? ? ? ?Alcohol Misuse Screening due??03/20/19??and every 1??year(s)  ??? ? ? ?Bone Density Screening due??03/20/19??Variable frequency  ??? ? ? ?Breast Cancer Screening (Senior Wellness) due??03/20/19??and every?  ??? ? ? ?Cognitive Screening due??03/20/19??and every 1??year(s)  ??? ? ? ?Colorectal Screening (Senior Wellness) due??03/20/19??and every?  ??? ? ? ?Fall Risk Assessment  due??03/20/19??and every 1??year(s)  ??? ? ? ?Functional Assessment due??03/20/19??and every 1??year(s)  ??? ? ? ?Geriatric Depression Screening due??03/20/19??and every 1??year(s)  ??? ? ? ?Lung Cancer Screening due??03/20/19??and every 1??year(s)  ??? ? ? ?Pneumococcal Vaccine due??03/20/19??Variable frequency  ??? ? ? ?Pneumococcal Vaccine due??03/20/19??and every?  ??? ? ? ?Tetanus Vaccine due??03/20/19??and every 10??year(s)  ??? ? ? ?Zoster Vaccine due??03/20/19??and every 100??year(s)  ??? ??Due In Future?  ??? ? ? ?Aspirin Therapy for CVD Prevention not due until??08/06/19??and every 1??year(s)  ???Satisfied?(in the past 1 year)  ??? ??Satisfied?  ??? ? ? ?Aspirin Therapy for CVD Prevention on??08/06/18.  ??? ? ? ?Blood Pressure Screening on??03/20/19.??Satisfied by Kathy Laguna  ??? ? ? ?Body Mass Index Check on??03/20/19.??Satisfied by Kathy Laguna  ??? ? ? ?Coronary Artery Disease Maintenance-Antiplatelet Agent Prescribed on??08/06/18.  ??? ? ? ?Depression Screening on??09/20/18.??Satisfied by Kathy Laguna  ??? ? ? ?Obesity Screening on??03/20/19.??Satisfied by Kathy Laguna  ?  ?

## 2022-05-01 NOTE — HISTORICAL [facility] CERNER
This is a historical note converted from [employer]. Formatting and pictures may have been removed.  Please reference [employer] for original formatting and attached multimedia. Chief Complaint  LEFT HIP AND LOW BACK AND LEFT LEG DOI 3 WEEKS AGO NO INJURY.  History of Present Illness  About 2 weeks ago she lifted 6 bags of mulch to put in her flower beds. Since that time she has had lower back pain. She has no radiation into her legs no numbness or tingling. She notes some?weakness but this is due to pain. It is there most of the time today it seems to be better. She has no bowel or bladder problems.?I have injected her in the past for a left trochanteric bursitis.?But now she is developing more lateral and anterior lateral pain. This is worse when she goes upstairs.  Review of Systems  Comprehensive Review of Systems performed with no exceptions other than as noted in HPI.  ?  ?  Physical Exam  Vitals & Measurements  BP:?114/77?  HT:?168?cm? WT:?89.53?kg? BMI:?31.72?  Examination of the lumbar spine. She has some tenderness over both SI joints and the sacrum. She flexes fingertips to below her knees. Extension causes pain lateral bending is mid range and causes pain. She can walk on her heels and toes. Knee jerks and ankle jerks are +2 and symmetrical.?Sitting straight leg raising is negative. Babinski is normal. There is no clonus. Supine straight leg raising is negative she has a slightly positive Go test bilaterally.?Sensation is equal to light touch in both lower extremities.  ?  Examination of the left hip reveals tenderness over the greater trochanter?and slightly anterior. She does have pain with?flexion internal rotation and abduction.  Assessment/Plan  1.?Acute low back pain?M54.5  ? We talked about performing a?an injection into her left hip which I think that her pain is still component of the old bursitis.  ?  In regards to her back?I have seen her in a prescription for 500 mg naproxen twice a day. We talked  about a Medrol Dosepak helped him Im afraid this would be too much cortisone to do with her diabetes.?She has had the injections in the past which have?not?affected her blood sugar.  ?  Under sterile conditions?2 mL of Celestone and 2 mL of plain Xylocaine were injected into the left trochanteric bursa.  Ordered:  betamethasone, 12 mg, Intra-Articular, Once, first dose 05/06/19 14:00:00 CDT, stop date 05/06/19 14:00:00 CDT  Lidocaine inj., 2 mL, Intra-Articular, Once, first dose 05/06/19 13:25:00 CDT, stop date 05/06/19 13:25:00 CDT  methylPREDNISolone, = 1 packet(s), Oral, As Directed, as directed on package labeling, # 21 tab(s), 0 Refill(s), Pharmacy: [facility]/pharmacy #[id number]  naproxen, 500 mg = 1 tab(s), Oral, BID, # 60 tab(s), 3 Refill(s), Pharmacy: [facility]/pharmacy #[id number]  asp/inj jnt/bursa, major [id number] PC, 05/06/19 13:25:00 CDT, [facility], Routine, 05/06/19 13:25:00 CDT  Office/Outpatient Visit Level 4 Established 49115 PC, Acute low back pain  Wedge compression fracture of T11-T12 vertebra, initial encounter for closed fracture  Lumbar pain, Orthopaedics, 05/06/19 13:22:00 CDT  XR Pelvis 1 or 2 Views, Routine, 05/06/19 12:56:00 CDT, Pain, None, Ambulatory, Rad Type, Lumbar pain, Not Scheduled, 05/06/19 12:56:00 CDT  XR Spine Lumbar 2 or 3 Views, Routine, 05/06/19 12:55:00 CDT, Back Pain, None, Ambulatory, Rad Type, Lumbar pain, Not Scheduled, 05/06/19 12:55:00 CDT  ?  2.?Wedge compression fracture of T11-T12 vertebra, initial encounter for closed fracture?S22.080A  Ordered:  betamethasone, 12 mg, Intra-Articular, Once, first dose 05/06/19 14:00:00 CDT, stop date 05/06/19 14:00:00 CDT  Lidocaine inj., 2 mL, Intra-Articular, Once, first dose 05/06/19 13:25:00 CDT, stop date 05/06/19 13:25:00 CDT  methylPREDNISolone, = 1 packet(s), Oral, As Directed, as directed on package labeling, # 21 tab(s), 0 Refill(s), Pharmacy: [facility]/pharmacy #[id number]  naproxen, 500 mg = 1 tab(s), Oral, BID, # 60 tab(s), 3 Refill(s),  Pharmacy: [facility]/pharmacy #[id number]  asp/inj jnt/bursa, major [id number] , 05/06/19 13:25:00 CDT, [facility], Routine, 05/06/19 13:25:00 CDT  Office/Outpatient Visit Level 4 Established [id number] , Acute low back pain  Wedge compression fracture of T11-T12 vertebra, initial encounter for closed fracture  [facility], 05/06[facility] 13:22:00 CDT  ?  3.?Trochanteric bursitis, left hip?M70.62  Ordered:  betamethasone, 12 mg, Intra-Articular, Once, first dose 05/06/19 14:00:00 CDT, stop date 05/06/19 14:00:00 CDT  Lidocaine inj., 2 mL, Intra-Articular, Once, first dose 05/06/19 13:25:00 CDT, stop date 05/06/19 13:25:00 CDT  asp/inj jnt/bursa, major [id number] , 05/06/19 13:25:00 CDT, Orthopaedics Clinic, Routine, 05/06/19 13:25:00 CDT  ?  Orders:  Clinic Follow-up PRN, 05/06/19 13:21:00 CDT, Future Order, [facility]""edics  Referrals  Clinic Follow-up PRN, 05/06/19 13:21:00 CDT, Future Order, Orthopaedics   Problem List/Past Medical History  Ongoing  Acute low back pain  Lumbar spine strain  Obesity  Trigger thumb of right hand  Trochanteric bursitis, left hip  Historical  No qualifying data  Procedure/Surgical History  Colon  Hernia  Hysterectomy   Medications  aspirin 81 mg oral tablet, 81 mg= 1 tab(s), Oral, BID  benzonatate 100 mg oral capsule, Oral, TID  cefdinir 300 mg oral capsule, 300 mg= 1 cap(s), Oral, BID  CeleBREX 200 mg oral capsule, 200 mg= 1 cap(s), Oral, BID,? ?Not taking: Last Dose Date/Time Unknown  Celestone, 12 mg, Intra-Articular, Once  Crestor, Oral, Once a day (at bedtime),? ?Not taking: Last Dose Date/Time Unknown  cyclobenzaprine 5 mg oral tablet, 5 mg= 1 tab(s), Oral, BID  Cymbalta 60 mg oral delayed release capsule, 60 mg= 1 cap(s), Oral, Daily,? ?Not Taking per Prescriber: Last Dose Date/Time Unknown  Dexilant, Oral, Daily,? ?Not taking: Last Dose Date/Time Unknown  dextromethorphan-promethazine 15 mg-6.25 mg/5 mL oral syrup, Oral, q6hr  Fish Oil oral capsule, 1 cap(s), Oral,  Daily  fluticasone 50 mcg/inh nasal spray  glimepiride 2 mg oral tablet, 2 mg= 1 tab(s), Oral, BID  homatropine-hydrocodone 1.5 mg-5 mg/5 mL oral syrup  ipratropium 42 mcg/inh (0.06%) nasal spray  lidocaine 2% injectable solution, 2 mL, Intra-Articular, Once  Medrol Dosepak 4 mg oral tablet, 1 packet(s), Oral, As Directed  metformin 500 mg oral tablet, 500 mg= 1 tab(s), Oral, BID  Mobic 7.5 mg oral tablet, 7.5 mg= 1 tab(s), Oral, BID, 3 refills,? ?Not taking: Last Dose Date/Time Unknown  Mobic 7.5 mg oral tablet, 7.5 mg= 1 tab(s), Oral, BID, 3 refills,? ?Not Taking, Completed Rx: Last Dose Date/Time Unknown  naproxen 500 mg oral tablet, 500 mg= 1 tab(s), Oral, BID, 3 refills  ondansetron 4 mg oral tablet, Oral, q8hr  pramipexole 0.5 mg oral tablet, 0.5 mg= 1 tab(s), Oral, TID  pramipexole 1 mg oral tablet, 1 mg= 1 tab(s), Oral, Daily  prednisONE 50 mg oral tablet, 50 mg= 1 tab(s), Oral, Daily  traMADol 50 mg oral tablet, 50 mg= 1 tab(s), Oral, BID  traZODONE 150 mg oral tab ( Desyrel ), 150 mg= 1 tab(s), Oral, Once a day (at bedtime),? ?Not taking: Last Dose Date/Time Unknown  Ventolin HFA 90 mcg/inh inhalation aerosol  Vitamin D, Oral  Allergies  sulfa drugs  Social History  Alcohol  Current, Wine, 3-5 times per week, 12/07/2016  Current, Wine, 3-5 times per week, 09/22/2016  Substance Abuse  Never, 09/22/2016  Tobacco  Never smoker, 09/22/2016  Family History  Diabetes mellitus type 1.: Brother.  Health Maintenance  Health Maintenance  ???Pending?(in the next year)  ??? ??OverDue  ??? ? ? ?Coronary Artery Disease Maintenance-Antiplatelet Agent Prescribed due??and every?  ??? ? ? ?Coronary Artery Disease Maintenance-Lipid Lowering Therapy due??and every?  ??? ? ? ?Advance Directive due??01/01/19??and every 1??year(s)  ??? ? ? ?Alcohol Misuse Screening due??01/01/19??and every 1??year(s)  ??? ? ? ?Cognitive Screening due??01/01/19??and every 1??year(s)  ??? ? ? ?Fall Risk Assessment due??01/01/19??and every  1??year(s)  ??? ? ? ?Functional Assessment due??01/01/19??and every 1??year(s)  ??? ? ? ?Geriatric Depression Screening due??01/01/19??and every 1??year(s)  ??? ??Due?  ??? ? ? ?ADL Screening due??05/06/19??and every 1??year(s)  ??? ? ? ?Bone Density Screening due??05/06/19??Variable frequency  ??? ? ? ?Breast Cancer Screening (Senior Wellness) due??05/06/19??and every?  ??? ? ? ?Colorectal Screening (Senior Wellness) due??05/06/19??and every?  ??? ? ? ?Lung Cancer Screening due??05/06/19??and every 1??year(s)  ??? ? ? ?Pneumococcal Vaccine due??05/06/19??Variable frequency  ??? ? ? ?Pneumococcal Vaccine due??05/06/19??and every?  ??? ? ? ?Tetanus Vaccine due??05/06/19??and every 10??year(s)  ??? ? ? ?Zoster Vaccine due??05/06/19??and every 100??year(s)  ??? ??Due In Future?  ??? ? ? ?Aspirin Therapy for CVD Prevention not due until??08/06/19??and every 1??year(s)  ??? ? ? ?Obesity Screening not due until??01/01/20??and every 1??year(s)  ???Satisfied?(in the past 1 year)  ??? ??Satisfied?  ??? ? ? ?Aspirin Therapy for CVD Prevention on??08/06/18.  ??? ? ? ?Blood Pressure Screening on??05/06/19.??Satisfied by Kathy Laguna  ??? ? ? ?Body Mass Index Check on??05/06/19.??Satisfied by Kathy Laguna  ??? ? ? ?Coronary Artery Disease Maintenance-Antiplatelet Agent Prescribed on??08/06/18.  ??? ? ? ?Depression Screening on??09/20/18.??Satisfied by Kathy Laguna  ??? ? ? ?Obesity Screening on??05/06/19.??Satisfied by Kathy Laguna  ?  ?  Diagnostic Results  AP of the pelvis is normal.  ?  3 views of the lumbar spine. There is an old T12 compression fracture otherwise they are normal.

## 2022-05-01 NOTE — HISTORICAL [facility]
This is a historical note converted from [employer]. Formatting and pictures may have been removed.  Please reference Jennifer for original formatting and attached multimedia. Chief Complaint  BILATERAL HIP PAIN DOI 2 MONTHS NO PARTICULAR INJURY. SHE STATES HER PAIN IS AT A 6 AT NIGHT.  History of Present Illness  she presents today with about a 3 month history of left hip pain?now developing to right hip pain. The pain is becoming worse and its waking?her at night?it hurts to touch it?or bumps it. And again she cannot lay on it. Now it is such that she is having to lay on her right hip and its beginning to hurt.?There is no numbness or tingling no radiation. It does not come from her back.  Review of Systems  Comprehensive Review of Systems performed with no exceptions other than as noted in HPI.  ?  ?  Physical Exam  Vitals & Measurements  BP:?115/72?  HT:?168?cm? HT:?168?cm? WT:?90.08?kg? WT:?90.08?kg? BMI:?31.92?  General_oriented ?3  Eye_PERRLA  HENT_normal hearing  Neck_  Respiratory_normal breath sounds  Cardiovascular_regular rate and rhythm  Gastrointestinal_no palpable masses or abdominal pain, no pulsatile masses  Lymphatics_no extremity edema or swelling  Musculoskeletal_examination of the left hip. She is point tender directly over the superior aspect of the greater trochanter. She has full flexion?extension internal and external rotation and flexion and internal rotation and adduction in this?increases the pain over the greater trochanter.?Neurovascularly she is intact.  ?  Examination of the right hip.?She has slight tenderness over the right greater trochanter pain with flexion internal rotation and adduction.?She does have a full range of motion.?Neurovascularly she is intact.  ?  She has a normal gait?and negative Trendelenburg.  Integumentary_no rashes or skin lesions  Neurologic_CNS II-XII?grossly intact  Assessment/Plan  1.?Trochanteric bursitis, left hip  ? I discussed with her trochanteric bursitis.  I recommend that we injected with Celestone and Xylocaine into the left hip. She agreed. She will follow her blood sugar and up her metformin as?necessary.?Ill see her back in 3 weeks. A right hip we will only watch it.  Ordered:  betamethasone, 12 mg, Intra-Articular, Once, first dose 08/06/18 11:00:00 CDT, stop date 08/06/18 11:00:00 CDT  Lidocaine inj., 2 mL, Intra-Articular, Once, first dose 08/06/18 10:43:00 CDT, stop date 08/06/18 10:43:00 CDT  asp/inj jnt/bursa, major [id number] PC, 08/06/18 10:43:00 CDT, [facility], Routine, 08/06/18 10:43:00 CDT  Clinic Follow up, *Est. 09/05/18 13:30:00 CDT, Order for future visit, Trochanteric bursitis, left hip, [facility]  Office/Outpatient Visit Level 3 Established [id number] PC, Trochanteric bursitis, left hip  Pain in left hip, [facility], 08/06/18 10:43:00 CDT  ?  Pain in left hip  Ordered:  betamethasone, 12 mg, Intra-Articular, Once, first dose 08/06/18 11:00:00 CDT, stop date 08/06/18 11:00:00 CDT  Lidocaine inj., 2 mL, Intra-Articular, Once, first dose 08/06/18 10:43:00 CDT, stop date 08/06/18 10:43:00 CDT  asp/inj jnt/bursa, major [id number] , 08/06/18 10:43:00 CDT, [facility], Routine, 08/06/18 10:43:00 CDT  Clinic Follow up, *Est. 09/05/18 13:30:00 CDT, Order for future visit, Trochanteric bursitis, left hip, [facility]  Office/Outpatient Visit Level 3 Established [id number] PC, Trochanteric bursitis, left hip  Pain in left hip, [facility], 08/06/18 10:43:00 CDT  ?   Problem List/Past Medical History  Ongoing  Acute low back pain  Lumbar spine strain  Obesity  Trigger thumb of right hand  Trochanteric bursitis, left hip  Historical  No qualifying data  Procedure/Surgical History  Colon  Hernia  Hysterectomy   Medications  aspirin 81 mg oral tablet, 81 mg= 1 tab(s), Oral, BID  CeleBREX 200 mg oral capsule, 200 mg= 1 cap(s), Oral, BID  Celestone, 12 mg, Intra-Articular, Once  Crestor, Oral, Once a day (at  bedtime),? ?Not taking  Cymbalta 60 mg oral delayed release capsule, 60 mg= 1 cap(s), Oral, Daily,? ?Not Taking per Prescriber  Dexilant, Oral, Daily  Fish Oil oral capsule, 1 cap(s), Oral, Daily  lidocaine 2% injectable solution, 2 mL, Intra-Articular, Once  metformin 500 mg oral tablet, 500 mg= 1 tab(s), Oral, BID  Mobic 7.5 mg oral tablet, 7.5 mg= 1 tab(s), Oral, BID, 3 refills,? ?Not taking  Mobic 7.5 mg oral tablet, 7.5 mg= 1 tab(s), Oral, BID, 3 refills,? ?Not Taking, Completed Rx  pramipexole 0.5 mg oral tablet, 0.5 mg= 1 tab(s), Oral, TID  traZODONE 150 mg oral tab ( Desyrel ), 150 mg= 1 tab(s), Oral, Once a day (at bedtime)  Vitamin D, Oral  Allergies  sulfa drugs  Social History  Alcohol  Current, Wine, 3-5 times per week, 12/07/2016  Current, Wine, 3-5 times per week, 09/22/2016  Substance Abuse  Never, 12/07/2016  Never, 09/22/2016  Tobacco  Never smoker, 12/07/2016  Never smoker, 09/22/2016  Family History  Diabetes mellitus type 1.: Brother.  Health Maintenance  Health Maintenance  ???Pending?(in the next year)  ??? ??OverDue  ??? ? ? ?Coronary Artery Disease Maintenance-Lipid Lowering Therapy due??and every?  ??? ??Due?  ??? ? ? ?Alcohol Misuse Screening due??08/06/18??and every 1??year(s)  ??? ? ? ?Bone Density Screening due??08/06/18??Variable frequency  ??? ? ? ?Breast Cancer Screening (Senior Wellness) due??08/06/18??and every?  ??? ? ? ?Colorectal Screening (Senior Wellness) due??08/06/18??and every?  ??? ? ? ?Coronary Artery Disease Maintenance-Antiplatelet Agent Prescribed due??08/06/18??and every?  ??? ? ? ?Fall Risk Assessment due??08/06/18??and every 1??year(s)  ??? ? ? ?Functional Assessment due??08/06/18??and every 1??year(s)  ??? ? ? ?Pneumococcal Vaccine due??08/06/18??and every 100??year(s)  ??? ? ? ?Pneumococcal Vaccine due??08/06/18??and every?  ??? ? ? ?Tetanus Vaccine due??08/06/18??and every 10??year(s)  ??? ? ? ?Zoster Vaccine due??08/06/18??and every 100??year(s)  ??? ??Due In  Future?  ??? ? ? ?Smoking Cessation (Coronary Artery Disease) not due until??03/08/19??and every 2??year(s)  ???Satisfied?(in the past 1 year)  ??? ??Satisfied?  ??? ? ? ?Aspirin Therapy for CVD Prevention on??08/06/18.  ??? ? ? ?Blood Pressure Screening on??08/06/18.??Satisfied by Kathy Laguna  ??? ? ? ?Body Mass Index Check on??08/06/18.??Satisfied by Kathy Laguna  ??? ? ? ?Coronary Artery Disease Maintenance-Antiplatelet Agent Prescribed on??08/06/18.  ??? ? ? ?Depression Screening on??08/06/18.??Satisfied by Kathy Laguna  ??? ? ? ?Diabetes Screening on??11/15/17.??Satisfied by Vera Quevedo  ??? ? ? ?Obesity Screening on??08/06/18.??Satisfied by Kathy Laguna  ?  ?  Diagnostic Results  AP of the pelvis and AP and lateral of both hips. There is some mild?narrowing at the acetabular edge?on the right hip?with?incomplete coverage of the hip itself. Of the left hip is normal.      under sterile conditions 2 mL of plain Xylocaine and 2 mL of Celestone were injected?into?the left trochanteric bursa.

## 2022-06-23 PROBLEM — H25.12 AGE-RELATED NUCLEAR CATARACT OF LEFT EYE: Status: ACTIVE | Noted: 2022-06-23

## 2023-04-19 PROBLEM — H25.11 AGE-RELATED NUCLEAR CATARACT OF RIGHT EYE: Status: ACTIVE | Noted: 2023-04-19

## 2023-04-19 PROBLEM — H25.12 AGE-RELATED NUCLEAR CATARACT OF LEFT EYE: Status: RESOLVED | Noted: 2022-06-23 | Resolved: 2023-04-19
